# Patient Record
Sex: FEMALE | Race: BLACK OR AFRICAN AMERICAN | NOT HISPANIC OR LATINO | Employment: PART TIME | URBAN - METROPOLITAN AREA
[De-identification: names, ages, dates, MRNs, and addresses within clinical notes are randomized per-mention and may not be internally consistent; named-entity substitution may affect disease eponyms.]

---

## 2020-04-29 ENCOUNTER — TELEPHONE (OUTPATIENT)
Dept: FAMILY MEDICINE | Facility: HOSPITAL | Age: 35
End: 2020-04-29

## 2020-04-29 NOTE — TELEPHONE ENCOUNTER
Called patient for scheduled telephone visit however she did not answer.  She will need to call clinic to reschedule future appointment.        Luz Dave MD   [facility] PGY-2   [facility]  04/29/2020 3:01 PM

## 2020-05-07 ENCOUNTER — OFFICE VISIT (OUTPATIENT)
Dept: FAMILY MEDICINE | Facility: HOSPITAL | Age: 35
End: 2020-05-07
Attending: FAMILY MEDICINE

## 2020-05-07 VITALS
HEIGHT: 67 IN | DIASTOLIC BLOOD PRESSURE: 113 MMHG | HEART RATE: 82 BPM | SYSTOLIC BLOOD PRESSURE: 163 MMHG | WEIGHT: 207.25 LBS | BODY MASS INDEX: 32.53 KG/M2

## 2020-05-07 DIAGNOSIS — G43.009 MIGRAINE WITHOUT AURA AND WITHOUT STATUS MIGRAINOSUS, NOT INTRACTABLE: Primary | ICD-10-CM

## 2020-05-07 DIAGNOSIS — I10 ESSENTIAL HYPERTENSION: ICD-10-CM

## 2020-05-07 DIAGNOSIS — G56.02 CARPAL TUNNEL SYNDROME ON LEFT: ICD-10-CM

## 2020-05-07 DIAGNOSIS — Z31.69 ENCOUNTER FOR PRECONCEPTION CONSULTATION: ICD-10-CM

## 2020-05-07 PROCEDURE — 99213 OFFICE O/P EST LOW 20 MIN: CPT | Performed by: STUDENT IN AN ORGANIZED HEALTH CARE EDUCATION/TRAINING PROGRAM

## 2020-05-07 RX ORDER — NIFEDIPINE 30 MG/1
30 TABLET, EXTENDED RELEASE ORAL DAILY
Qty: 30 TABLET | Refills: 1 | Status: SHIPPED | OUTPATIENT
Start: 2020-05-07 | End: 2021-05-07

## 2020-05-07 NOTE — LETTER
May 8, 2020      [facility] [address], [address]  [place] [address]  Phone: [phone]  Fax: [phone]       Patient: Deana Mart   YOB: 1985  Date of Visit: 05/08/2020    To Whom It May Concern:    Lázaro Mart  was at [facility] on 05/07/2020.  Please excuse her from work from 5/8/20- 5/22/20. If you have any questions or concerns, or if I can be of further assistance, please do not hesitate to contact me.    Sincerely,        Milly Keller MD

## 2020-05-08 ENCOUNTER — TELEPHONE (OUTPATIENT)
Dept: FAMILY MEDICINE | Facility: HOSPITAL | Age: 35
End: 2020-05-08

## 2020-05-08 RX ORDER — SUMATRIPTAN SUCCINATE 25 MG/1
50 TABLET ORAL EVERY 12 HOURS PRN
Qty: 30 TABLET | Refills: 0 | Status: SHIPPED | OUTPATIENT
Start: 2020-05-08 | End: 2020-06-07

## 2020-05-08 NOTE — TELEPHONE ENCOUNTER
----- Message from Luzmaria Thompson, Patient Care Assistant sent at 5/8/2020 10:39 AM CDT -----  Contact: Mrs. Leblanc  Pt will like a call back to discuss her blood pressure and her wrist. She can be reached at [phone].
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
## 2020-05-09 NOTE — PROGRESS NOTES
I assume primary medical responsibility for this patient. I have reviewed the history, physical, and assessement & treatment plan with the resident and agree that the care is reasonable and necessary. This service has been performed by a resident without the presence of a teaching physician under the primary care exception. If necessary, an addendum of additional findings or evaluation beyond the resident documentation will be noted below.    Silvia May MD